# Patient Record
Sex: MALE | Race: WHITE | Employment: OTHER | URBAN - METROPOLITAN AREA
[De-identification: names, ages, dates, MRNs, and addresses within clinical notes are randomized per-mention and may not be internally consistent; named-entity substitution may affect disease eponyms.]

---

## 2017-11-30 ENCOUNTER — APPOINTMENT (OUTPATIENT)
Dept: CT IMAGING | Facility: HOSPITAL | Age: 62
End: 2017-11-30
Attending: EMERGENCY MEDICINE
Payer: COMMERCIAL

## 2017-11-30 ENCOUNTER — HOSPITAL ENCOUNTER (OUTPATIENT)
Facility: HOSPITAL | Age: 62
Setting detail: OBSERVATION
Discharge: HOME | End: 2017-12-02
Attending: EMERGENCY MEDICINE | Admitting: HOSPITALIST
Payer: COMMERCIAL

## 2017-11-30 DIAGNOSIS — R41.82 ALTERED MENTAL STATUS, UNSPECIFIED ALTERED MENTAL STATUS TYPE: Primary | ICD-10-CM

## 2017-11-30 PROCEDURE — 99219 INITIAL OBSERVATION CARE,LEVL II: CPT | Performed by: HOSPITALIST

## 2017-11-30 PROCEDURE — 70450 CT HEAD/BRAIN W/O DYE: CPT | Performed by: EMERGENCY MEDICINE

## 2017-12-01 PROCEDURE — 90792 PSYCH DIAG EVAL W/MED SRVCS: CPT | Performed by: OTHER

## 2017-12-01 PROCEDURE — 99226 SUBSEQUENT OBSERVATION CARE: CPT | Performed by: HOSPITALIST

## 2017-12-01 RX ORDER — ACETAMINOPHEN 325 MG/1
650 TABLET ORAL EVERY 6 HOURS PRN
Status: DISCONTINUED | OUTPATIENT
Start: 2017-12-01 | End: 2017-12-02

## 2017-12-01 RX ORDER — PANTOPRAZOLE SODIUM 40 MG/1
40 TABLET, DELAYED RELEASE ORAL
Status: DISCONTINUED | OUTPATIENT
Start: 2017-12-01 | End: 2017-12-02

## 2017-12-01 RX ORDER — ENOXAPARIN SODIUM 100 MG/ML
40 INJECTION SUBCUTANEOUS DAILY
Status: DISCONTINUED | OUTPATIENT
Start: 2017-12-01 | End: 2017-12-02

## 2017-12-01 RX ORDER — METOPROLOL SUCCINATE 50 MG/1
50 TABLET, EXTENDED RELEASE ORAL DAILY
Status: DISCONTINUED | OUTPATIENT
Start: 2017-12-01 | End: 2017-12-01

## 2017-12-01 RX ORDER — ONDANSETRON 2 MG/ML
4 INJECTION INTRAMUSCULAR; INTRAVENOUS EVERY 6 HOURS PRN
Status: DISCONTINUED | OUTPATIENT
Start: 2017-12-01 | End: 2017-12-02

## 2017-12-01 RX ORDER — FLUOXETINE HYDROCHLORIDE 20 MG/1
40 CAPSULE ORAL DAILY
Status: DISCONTINUED | OUTPATIENT
Start: 2017-12-01 | End: 2017-12-02

## 2017-12-01 RX ORDER — ZIPRASIDONE MESYLATE 20 MG/ML
10 INJECTION, POWDER, LYOPHILIZED, FOR SOLUTION INTRAMUSCULAR EVERY 4 HOURS PRN
Status: DISCONTINUED | OUTPATIENT
Start: 2017-12-01 | End: 2017-12-02

## 2017-12-01 RX ORDER — MEMANTINE HYDROCHLORIDE 10 MG/1
10 TABLET ORAL 2 TIMES DAILY
Status: DISCONTINUED | OUTPATIENT
Start: 2017-12-01 | End: 2017-12-02

## 2017-12-01 RX ORDER — DIVALPROEX SODIUM 250 MG/1
250 TABLET, DELAYED RELEASE ORAL 2 TIMES DAILY
Status: DISCONTINUED | OUTPATIENT
Start: 2017-12-01 | End: 2017-12-02

## 2017-12-01 RX ORDER — DONEPEZIL HYDROCHLORIDE 10 MG/1
10 TABLET, FILM COATED ORAL NIGHTLY
Status: DISCONTINUED | OUTPATIENT
Start: 2017-12-01 | End: 2017-12-02

## 2017-12-01 RX ORDER — CLONAZEPAM 1 MG/1
1 TABLET ORAL 2 TIMES DAILY PRN
Status: DISCONTINUED | OUTPATIENT
Start: 2017-12-01 | End: 2017-12-02

## 2017-12-01 RX ORDER — ATORVASTATIN CALCIUM 20 MG/1
20 TABLET, FILM COATED ORAL DAILY
Status: DISCONTINUED | OUTPATIENT
Start: 2017-12-01 | End: 2017-12-02

## 2017-12-01 NOTE — H&P
[facility] Patient Status:  Emergency    1955 MRN [id number]   Location [address] Attending Clinton Gardiner MD   Hosp Day # 0 PCP Jenifer Gomes MD, MD     Date HCl 10 MG Oral Tab   Yes No   Sig: Take 10 mg by mouth 2 (two) times daily. Metoprolol Succinate ER (TOPROL XL) 50 MG Oral Tablet 24 Hr   Yes No   Sig: Take 1 tablet by mouth daily.    Multiple Vitamins-Minerals (MULTIVITAMIN OR)   Yes No   Sig: Take 1 ta versus brief reactive psychosis  Psychiatry has been consulted, patient will require one-to-one sitter. We will continue to monitor, use Geodon as needed for agitation.   Resume Aricept and Namenda    Benign hypertension  Blood pressure well controlled we

## 2017-12-01 NOTE — PLAN OF CARE
Problem: Patient/Family Goals  Goal: Patient/Family Long Term Goal  Patient's Long Term Goal: return home    Interventions:  - follow POC    - See additional Care Plan goals for specific interventions    Outcome: Progressing    Goal: Patient/Family Short T seizure to MD/LIP  - If seizure occurs, turn patient to side and suction secretions as needed  - Reorient patient post seizure  - Seizure pads on all 4 side rails  - Instruct patient/family to notify RN of any seizure activity  - Instruct patient/family to

## 2017-12-01 NOTE — CM/SW NOTE
SW contacted pt's wife/Marcela to discuss discharge planning. Pt lives at home w/ wife. Pt does not use any DME or services at this time. Per wife, she cooks for pt, but pt is independent w/ all other ADL's. Pt does not drive.      SEKOU discussed situation that

## 2017-12-01 NOTE — ED PROVIDER NOTES
Patient Seen in: [facility] Emergency Department    History   Patient presents with:  Eval-P (psychiatric)    Stated Complaint: Psych    HPI    46-year-old male with history of hypertension, hyperlipidemia, anxiety, depression, brought in by famil vomiting. Genitourinary: Negative for dysuria, flank pain and frequency. Musculoskeletal: Negative for back pain. Skin: Negative for rash. Neurological: Negative for weakness, light-headedness and headaches.    Psychiatric/Behavioral: Positive for a Nursing note and vitals reviewed.           ED Course     PROCEDURES:  none    DIAGNOSTICS:   Labs:    Recent Results (from the past 24 hour(s))  -BASIC METABOLIC PANEL (8)   Collection Time: 11/30/17  7:36 PM   Result Value Ref Range   Glucose 97 70 - 99 4.0 K/UL   Monocyte Absolute 0.8 0.0 - 1.0 K/UL   Eosinophil Absolute 0.3 0.0 - 0.7 K/UL   Basophil Absolute 0.0 0.0 - 0.2 K/UL       Imaging Results Available and Reviewed by me while in ED:  Ct Brain Or Head ([id number])    Result Date: 11/30/2017  CONCLUSION DEPARTMENT MEDICAL DECISION MAKING:  After obtaining the patient's history, performing the physical exam and reviewing the diagnostics, multiple initial diagnoses were considered based on the presenting problem including dementia, aggressive behavior, psyc
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
## 2017-12-02 VITALS
OXYGEN SATURATION: 94 % | HEIGHT: 68 IN | RESPIRATION RATE: 16 BRPM | BODY MASS INDEX: 26.52 KG/M2 | TEMPERATURE: 99 F | DIASTOLIC BLOOD PRESSURE: 79 MMHG | WEIGHT: 175 LBS | SYSTOLIC BLOOD PRESSURE: 148 MMHG | HEART RATE: 80 BPM

## 2017-12-02 PROCEDURE — 99217 OBSERVATION CARE DISCHARGE: CPT | Performed by: HOSPITALIST

## 2017-12-02 RX ORDER — CLONAZEPAM 0.5 MG/1
0.5 TABLET ORAL 3 TIMES DAILY PRN
Qty: 30 TABLET | Refills: 0 | Status: ON HOLD | OUTPATIENT
Start: 2017-12-02 | End: 2018-06-02

## 2017-12-02 RX ORDER — DIVALPROEX SODIUM 250 MG/1
250 TABLET, DELAYED RELEASE ORAL 2 TIMES DAILY
Qty: 60 TABLET | Refills: 0 | Status: ON HOLD | OUTPATIENT
Start: 2017-12-02 | End: 2018-06-02

## 2017-12-02 NOTE — PLAN OF CARE
NEUROLOGICAL - ADULT    • Achieves stable or improved neurological status Not Progressing    • Absence of seizures Not Progressing    • Remains free of injury related to seizure activity Not Progressing    • Achieves maximal functionality and self care Not

## 2017-12-02 NOTE — PLAN OF CARE
Problem: Patient/Family Goals  Goal: Patient/Family Long Term Goal  Patient's Long Term Goal: return home    Interventions:  - follow POC    - See additional Care Plan goals for specific interventions    Outcome: Progressing  Confused, impulsive with tende neurological status   Outcome: Progressing    Goal: Remains free of injury related to seizure activity  INTERVENTIONS:  - Maintain airway, patient safety  and administer oxygen as ordered  - Monitor patient for seizure activity, document and report duratio

## 2017-12-02 NOTE — PROGRESS NOTES
[facility]    Progress Note    Charbel Chahal.  [facility] Patient Status:  Observation    1955 MRN [id number]   [facility] 5SW/SE Attending Stella Rosales MD   Hosp Day # 0 PCP Chester Loza MD, MD       Subjective: consider SNF, SW to follow  D/w Dr. Alexi Lynn  Results:     Lab Results  Component Value Date   WBC 10.4 11/30/2017   HGB 15.4 11/30/2017   HCT 45.1 11/30/2017    11/30/2017   CREATSERUM 0.74 11/30/2017   BUN 13 11/30/2017    11/30/2017   K 3.9

## 2017-12-02 NOTE — PLAN OF CARE
Pt discharged home with all of his belongings accompanied by family. Discharge instructions and prescriptions given to pt's wife. Pt's wife verbalized understanding.  Iv discontinued

## 2017-12-04 NOTE — CONSULTS
[facility] HOSP - [facility]    Report of Consultation    Shital Bergeron Patient Status:  Observation    1955 MRN [id number]   [facility] 5SW/SE Attending No att. providers found   Hosp Day # 0 PCP Ashley Bellamy MD, MD     Date Medical History  Past Medical History:   Diagnosis Date   • Anxiety state, unspecified    • Dementia    • Depression    • Other and unspecified hyperlipidemia    • Unspecified essential hypertension        Past Surgical History  Past Surgical History:  No exhibited anxious and stressful affect especially when he make an effort to remember. Patient exhibited significant cognitive impairment and impairment in his immediate and recent memory.   Patient exhibited blocking thought the process and distractibility

## 2017-12-22 NOTE — DISCHARGE SUMMARY
[facility] HOSPITALIST  DISCHARGE SUMMARY     Medardo BullaMaritza Bergeron Patient Status:  Observation    1955 MRN [id number]   [facility] 5SW/SE Attending No att. providers found   Hosp Day # 0 PCP Yaron Lopez MD, MD     Date of Admission: taking these medications      Instructions Prescription details   divalproex Sodium 250 MG Tbec DR tab  Commonly known as:  DEPAKOTE      Take 1 tablet (250 mg total) by mouth 2 (two) times daily.    Quantity:  60 tablet  Refills:  0        CHANGE how you t Ankita    Schedule an appointment as soon as possible for a visit in 1 week      MD [facility] [address]  [address] [facility] [phone]    Schedule an appointment as soon as possible for a visit in 3

## 2018-05-28 ENCOUNTER — HOSPITAL ENCOUNTER (INPATIENT)
Facility: HOSPITAL | Age: 63
LOS: 5 days | Discharge: HOME | DRG: 057 | End: 2018-06-02
Attending: EMERGENCY MEDICINE | Admitting: HOSPITALIST

## 2018-05-28 ENCOUNTER — APPOINTMENT (OUTPATIENT)
Dept: CT IMAGING | Facility: HOSPITAL | Age: 63
DRG: 057 | End: 2018-05-28
Attending: EMERGENCY MEDICINE

## 2018-05-28 ENCOUNTER — APPOINTMENT (OUTPATIENT)
Dept: GENERAL RADIOLOGY | Facility: HOSPITAL | Age: 63
DRG: 057 | End: 2018-05-28
Attending: EMERGENCY MEDICINE

## 2018-05-28 DIAGNOSIS — G31.83 LEWY BODY DEMENTIA WITH BEHAVIORAL DISTURBANCE (HCC): ICD-10-CM

## 2018-05-28 DIAGNOSIS — F02.81 LEWY BODY DEMENTIA WITH BEHAVIORAL DISTURBANCE (HCC): ICD-10-CM

## 2018-05-28 DIAGNOSIS — E78.5 HYPERLIPIDEMIA, UNSPECIFIED HYPERLIPIDEMIA TYPE: ICD-10-CM

## 2018-05-28 DIAGNOSIS — F09 COGNITIVE DISORDER: ICD-10-CM

## 2018-05-28 DIAGNOSIS — R41.82 ALTERED MENTAL STATUS, UNSPECIFIED ALTERED MENTAL STATUS TYPE: Primary | ICD-10-CM

## 2018-05-28 PROBLEM — F03.90 RAPIDLY PROGRESSIVE DEMENTIA (HCC): Status: ACTIVE | Noted: 2018-05-28

## 2018-05-28 PROCEDURE — 70450 CT HEAD/BRAIN W/O DYE: CPT | Performed by: EMERGENCY MEDICINE

## 2018-05-28 PROCEDURE — 99220 INITIAL OBSERVATION CARE,LEVL III: CPT | Performed by: HOSPITALIST

## 2018-05-28 PROCEDURE — 71045 X-RAY EXAM CHEST 1 VIEW: CPT | Performed by: EMERGENCY MEDICINE

## 2018-05-28 RX ORDER — SODIUM CHLORIDE 0.9 % (FLUSH) 0.9 %
3 SYRINGE (ML) INJECTION AS NEEDED
Status: DISCONTINUED | OUTPATIENT
Start: 2018-05-28 | End: 2018-06-02

## 2018-05-28 RX ORDER — CLONAZEPAM 1 MG/1
1 TABLET ORAL 3 TIMES DAILY PRN
Status: DISCONTINUED | OUTPATIENT
Start: 2018-05-28 | End: 2018-06-02

## 2018-05-28 RX ORDER — MEMANTINE HYDROCHLORIDE 10 MG/1
10 TABLET ORAL 2 TIMES DAILY
Status: DISCONTINUED | OUTPATIENT
Start: 2018-05-28 | End: 2018-06-02

## 2018-05-28 RX ORDER — LORAZEPAM 1 MG/1
1 TABLET ORAL EVERY 6 HOURS PRN
Status: DISCONTINUED | OUTPATIENT
Start: 2018-05-28 | End: 2018-05-29

## 2018-05-28 RX ORDER — ACETAMINOPHEN 325 MG/1
650 TABLET ORAL EVERY 6 HOURS PRN
Status: DISCONTINUED | OUTPATIENT
Start: 2018-05-28 | End: 2018-06-02

## 2018-05-28 RX ORDER — ONDANSETRON 2 MG/ML
4 INJECTION INTRAMUSCULAR; INTRAVENOUS EVERY 6 HOURS PRN
Status: DISCONTINUED | OUTPATIENT
Start: 2018-05-28 | End: 2018-06-02

## 2018-05-28 RX ORDER — FLUOXETINE HYDROCHLORIDE 20 MG/1
40 CAPSULE ORAL DAILY
Status: DISCONTINUED | OUTPATIENT
Start: 2018-05-29 | End: 2018-06-02

## 2018-05-28 RX ORDER — DIVALPROEX SODIUM 250 MG/1
250 TABLET, DELAYED RELEASE ORAL 2 TIMES DAILY
Status: DISCONTINUED | OUTPATIENT
Start: 2018-05-28 | End: 2018-05-29

## 2018-05-28 RX ORDER — DOXEPIN HYDROCHLORIDE 50 MG/1
1 CAPSULE ORAL DAILY
Status: DISCONTINUED | OUTPATIENT
Start: 2018-05-29 | End: 2018-06-02

## 2018-05-28 RX ORDER — DONEPEZIL HYDROCHLORIDE 10 MG/1
10 TABLET, FILM COATED ORAL NIGHTLY
Status: DISCONTINUED | OUTPATIENT
Start: 2018-05-28 | End: 2018-06-02

## 2018-05-28 RX ORDER — ATORVASTATIN CALCIUM 20 MG/1
20 TABLET, FILM COATED ORAL DAILY
Status: DISCONTINUED | OUTPATIENT
Start: 2018-05-29 | End: 2018-06-02

## 2018-05-28 RX ORDER — PHENOL 1.4 %
1 AEROSOL, SPRAY (ML) MUCOUS MEMBRANE NIGHTLY
Status: ON HOLD | COMMUNITY
End: 2018-07-31

## 2018-05-28 NOTE — ED NOTES
Wife at bedside. Pt resting on cart. Remains calm and cooperative. Remains confused which is his normal. Denies pain or discomfort.

## 2018-05-28 NOTE — ED PROVIDER NOTES
Patient Seen in: [facility] AND [facility] Emergency Department    History   Patient presents with:  Altered Mental Status (neurologic)    Stated Complaint:     HPI    68-year-old male with history of frontal lobe dementia as well as hypertension living with wif Normal rate, regular rhythm and intact and equal distal pulses. Pulmonary/Chest: Effort normal. No respiratory distress. Clear breath sounds b/l. Abdominal: Soft. There is no tenderness. There is no guarding. Musculoskeletal: Normal range of motion. tests on the individual orders.    ETHYL ALCOHOL   RAINBOW DRAW BLUE   RAINBOW DRAW LAVENDER   RAINBOW DRAW DARK GREEN   RAINBOW DRAW LIGHT GREEN   RAINBOW DRAW GOLD   RAINBOW DRAW LAVENDER TALL (BNP)     EKG    Rate, intervals and axes as noted on EKG Repo small vessel ischemic changes are noted. CEREBELLUM: No evidence of edema, hemorrhage, mass effect or acute infarction. BRAINSTEM: No evidence of edema, hemorrhage, mass effect or acute infarction. CALVARIUM: No mass or other significant visible lesion.   [place]

## 2018-05-28 NOTE — ED INITIAL ASSESSMENT (HPI)
Pt here per Osmin Burton with c/o AMS. Pt has dementia. Pt was outside mowing lawn at neighbors and pt was not acting right so spouse call 911. Pt has no complaints, denies pain or discomfort. Pt is verbal and alert and oriented to self.

## 2018-05-29 PROCEDURE — 99233 SBSQ HOSP IP/OBS HIGH 50: CPT | Performed by: HOSPITALIST

## 2018-05-29 PROCEDURE — 99223 1ST HOSP IP/OBS HIGH 75: CPT | Performed by: OTHER

## 2018-05-29 RX ORDER — LORAZEPAM 2 MG/ML
0.5 INJECTION INTRAMUSCULAR EVERY 4 HOURS PRN
Status: DISCONTINUED | OUTPATIENT
Start: 2018-05-29 | End: 2018-06-02

## 2018-05-29 RX ORDER — POTASSIUM CHLORIDE 20 MEQ/1
40 TABLET, EXTENDED RELEASE ORAL ONCE
Status: COMPLETED | OUTPATIENT
Start: 2018-05-29 | End: 2018-05-29

## 2018-05-29 RX ORDER — QUETIAPINE 25 MG/1
25 TABLET, FILM COATED ORAL NIGHTLY
Status: DISCONTINUED | OUTPATIENT
Start: 2018-05-29 | End: 2018-05-30

## 2018-05-29 RX ORDER — LEVOTHYROXINE SODIUM 0.03 MG/1
25 TABLET ORAL
Status: DISCONTINUED | OUTPATIENT
Start: 2018-05-29 | End: 2018-06-02

## 2018-05-29 RX ORDER — IBUPROFEN 200 MG
CAPSULE ORAL 2 TIMES DAILY
Status: DISCONTINUED | OUTPATIENT
Start: 2018-05-29 | End: 2018-06-02

## 2018-05-29 RX ORDER — HALOPERIDOL 2 MG/ML
1 SOLUTION ORAL ONCE
Status: DISCONTINUED | OUTPATIENT
Start: 2018-05-29 | End: 2018-05-29

## 2018-05-29 RX ORDER — LORAZEPAM 2 MG/ML
0.5 INJECTION INTRAMUSCULAR ONCE
Status: COMPLETED | OUTPATIENT
Start: 2018-05-29 | End: 2018-05-29

## 2018-05-29 RX ORDER — LORAZEPAM 2 MG/ML
INJECTION INTRAMUSCULAR
Status: COMPLETED
Start: 2018-05-29 | End: 2018-05-29

## 2018-05-29 RX ORDER — DIVALPROEX SODIUM 500 MG/1
500 TABLET, DELAYED RELEASE ORAL 2 TIMES DAILY
Status: DISCONTINUED | OUTPATIENT
Start: 2018-05-29 | End: 2018-06-02

## 2018-05-29 NOTE — PROGRESS NOTES
[facility]    Progress Note    [facility] Patient Status:  Inpatient    1955 MRN [id number]   [facility] 5SW/SE Attending Roosvelt Habermann, MD   Hosp Day # 1 PCP Saúl Rosales MD, MD       Subjective:   Chao Lance Nightly   • FLUoxetine HCl  40 mg Oral Daily   • melatonin  10 mg Oral Nightly   • Memantine HCl  10 mg Oral BID   • multivitamin  1 tablet Oral Daily       Current PRN Inpatient Meds:      Normal Saline Flush, acetaminophen, ondansetron HCl, LORazepam, Cl 4. Atherosclerosis. 5. Pansinusitis. 6.    No significant change from November 30, 2017. This report was called immediately at 17:17 hours to Emergency Department and discussed with Dr. Venice Bermeo. Dictated by (CST):  Camryn Bull MD on 5/28/

## 2018-05-29 NOTE — PLAN OF CARE
Problem: Patient Centered Care  Goal: Patient preferences are identified and integrated in the patient's plan of care  Interventions:  - What would you like us to know as we care for you?  Patient wife wants to be kept updated  - Provide timely, complete, a skin care algorithm/standards of care as needed   Outcome: Progressing  Skin will remain clean, dry, and intact. Patient is encouraged to turn and reposition self while in bed every 2 hours. Prompt care is given to incontinence.  Skin is assess for redness indicated by assessment.  - Educate pt/family on patient safety including physical limitations  - Instruct pt to call for assistance with activity based on assessment  - Modify environment to reduce risk of injury  - Provide assistive devices as appropriat

## 2018-05-29 NOTE — H&P
[facility]    PATIENT'S NAME: Rafa Kennedy   ATTENDING PHYSICIAN: Segundo Fermin MD   PATIENT ACCOUNT#:   [id number]    LOCATION:  [place] [address]  MEDICAL RECORD #:   [id number]       YOB: 1955  ADMISSION DATE:       05/28 blood pressure 137/84, pulse ox 94% on room air. HEENT:  Atraumatic. Oropharynx clear. Moist mucous membranes. Normal hard and soft palate. Eyes:  Anicteric sclerae. Pupils equal, round, and reactive to light and accommodation.     NECK:  Trachea midl

## 2018-05-29 NOTE — CONSULTS
[facility] HOSP - [facility]    Report of Consultation    Janette Graham.  [facility] Patient Status:  Inpatient    1955 MRN [id number]   Location [facility] 5SW/SE Attending Maribel Cortés MD   Hosp Day # 1 PCP Akanksha Shea MD, MD     Date of Ad with the behavioral changes in that leg apparently was somewhat helpful. He was also on Aricept and Namenda. He was also on melatonin and Klonopin.   Otherwise patient did not develop any significant other symptoms, while there was some action tremors not mg 1 mg Oral TID PRN   Donepezil HCl (ARICEPT) tab 10 mg 10 mg Oral Nightly   FLUoxetine HCl (PROZAC) cap 40 mg 40 mg Oral Daily   melatonin cap/tab 10 mg 10 mg Oral Nightly   Memantine HCl (NAMENDA) tab 10 mg 10 mg Oral BID   multivitamin (ADULT) tab 1 ta memory and fund of knowledge. .  No clear abnormalities with language, though it was limited exam since patient barely talk to me and refused the rest of the exam    Cranial Nerves:    VII. Face symmetric, no facial weakness  VIII. Hearing decreased.     Rajeev differential includes Lewy body disease or frontotemporal dementia, at this point I am leaning towards the frontotemporal dementia as a more likely diagnosis especially with his disinhibition, personality changes, age of onset, and not clear if the symptom

## 2018-05-29 NOTE — CM/SW NOTE
SW contacted wife/Marcela to discuss discharge planning.  SW is familiar w/ pt due to previous encounter back on December 1st, 2017 - pt was hallucinating and was admitted to the hospital.    Per wife, pt lives at home w/ her, pt's son, his fiance, and their able to afford to privately pay at a facility and pay for private pay caregivers at this time. SW requested copy of card, if it is provided while pt is here at the hospital. Wife stated that if insurance will pay, she would like to consider rehab options.

## 2018-05-29 NOTE — PLAN OF CARE
Problem: Patient Centered Care  Goal: Patient preferences are identified and integrated in the patient's plan of care  Interventions:  - What would you like us to know as we care for you?  Patient wife wants to be kept updated  - Provide timely, complete, a Implement oral medicated treatments as ordered  Outcome: Progressing      Problem: NEUROLOGICAL - ADULT  Goal: Achieves stable or improved neurological status  INTERVENTIONS  - Assess for and report changes in neurological status  - Initiate measures to pr

## 2018-05-29 NOTE — PLAN OF CARE
Dr. Nabil Martel was notified regarding patient having an abrasion noted to left wrist. Neosporin ointment was ordered bid per doctor orders.

## 2018-05-30 PROCEDURE — 90792 PSYCH DIAG EVAL W/MED SRVCS: CPT | Performed by: OTHER

## 2018-05-30 PROCEDURE — 99231 SBSQ HOSP IP/OBS SF/LOW 25: CPT | Performed by: OTHER

## 2018-05-30 PROCEDURE — 99233 SBSQ HOSP IP/OBS HIGH 50: CPT | Performed by: HOSPITALIST

## 2018-05-30 RX ORDER — QUETIAPINE 25 MG/1
50 TABLET, FILM COATED ORAL NIGHTLY
Status: DISCONTINUED | OUTPATIENT
Start: 2018-05-30 | End: 2018-06-02

## 2018-05-30 RX ORDER — POTASSIUM CHLORIDE 20 MEQ/1
40 TABLET, EXTENDED RELEASE ORAL EVERY 4 HOURS
Status: COMPLETED | OUTPATIENT
Start: 2018-05-30 | End: 2018-05-30
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
## 2018-05-30 NOTE — PROGRESS NOTES
PSYCH CONSULT    Date of Admission: 5/28/18  Date of Consult: 5/30/18  Reason for Consultation: Altered mental status, agitation    Impression:  AXIS 1: Severe major neurocognitive disorder/dementia with behavioral disturbance.  Suspected Lewy body vs front

## 2018-05-30 NOTE — PLAN OF CARE
Problem: Patient Centered Care  Goal: Patient preferences are identified and integrated in the patient's plan of care  Interventions:  - What would you like us to know as we care for you?  Patient wife wants to be kept updated  - Provide timely, complete, a pressure  - Maintain blood pressure and fluid volume within ordered parameters to optimize cerebral perfusion and minimize risk of hemorrhage  - Monitor temperature, glucose, and sodium.  Initiate appropriate interventions as ordered   Outcome: Progressing

## 2018-05-30 NOTE — PROGRESS NOTES
[facility] AND [facility]  Neurology Progress Note    Madrid Suhas  [facility] Patient Status:  Inpatient    1955 MRN [id number]   Location [facility] 5SW/SE Attending Chitra Alicea Day # 2 PCP Dayanna Nunes MD, MD     Subjective:  Alivia Dutta Oral Oral   SpO2: 94% 95% 95% 94%   Weight:       Height:           General: No apparent distress, well nourished, well groomed.   Head- Normocephalic, atraumatic  Eyes- No redness or swelling  Neck- No masses or adenopathy  CV: pulses were palpable and nor 5/28/2018  CONCLUSION:  1. No acute intracranial changes. 2. Central and cortical atrophy. 3. Chronic small vessel ischemic changes. 4. Atherosclerosis. 5. Pansinusitis. 6.    No significant change from November 30, 2017.   This report was called immediatel

## 2018-05-30 NOTE — CONSULTS
[facility]  Report of Psychiatric Consultation    Liberty Vogel.  [facility] Patient Status:  Inpatient    1955 MRN [id number]   Location [facility] 5SW/SE Attending Jacquelyn Rocha,*   Hosp Day # 2 PCP Pia Kelly MD, MD     Date of or the date/time. He is unable to tell me what he did for work in the past.     Past Psychiatric History: Severe major neurocognitive disorder.      Substance Use History: Ex-cigarette smoker    Social and Developmental History: Lives with his wife, but Willian Fabian tablet, Oral, Daily    Review of Systems   Constitutional: Positive for malaise/fatigue. Psychiatric/Behavioral: Positive for memory loss. Negative for depression and suicidal ideas. The patient is nervous/anxious.       Psychiatry Review Systems: No voic

## 2018-05-30 NOTE — PROGRESS NOTES
[facility]    Progress Note    Sumit Zheng.  [facility] Patient Status:  Inpatient    1955 MRN [id number]   Location [facility] 5SW/SE Attending Chitra Alicea Day # 2 PCP Danette Small MD, MD     Subjective: treatment with synthroid       Hyperlipidemia, unspecified hyperlipidemia type  -statin      Rapidly progressive lewy body dementia  -as above     VTE : SCDs    38 min spent on pt of which 20 min spent counseling wife by phone with his permission and tryin occurred Electronically signed on 05/29/2018 at 15:47 by Joey Denis MD  5/30/2018

## 2018-05-31 PROCEDURE — 99233 SBSQ HOSP IP/OBS HIGH 50: CPT | Performed by: HOSPITALIST

## 2018-05-31 PROCEDURE — 99232 SBSQ HOSP IP/OBS MODERATE 35: CPT | Performed by: OTHER

## 2018-05-31 PROCEDURE — 99231 SBSQ HOSP IP/OBS SF/LOW 25: CPT | Performed by: OTHER

## 2018-05-31 RX ORDER — LORAZEPAM 2 MG/ML
2 INJECTION INTRAMUSCULAR ONCE
Status: COMPLETED | OUTPATIENT
Start: 2018-05-31 | End: 2018-06-01

## 2018-05-31 RX ORDER — POTASSIUM CHLORIDE 20 MEQ/1
40 TABLET, EXTENDED RELEASE ORAL ONCE
Status: COMPLETED | OUTPATIENT
Start: 2018-05-31 | End: 2018-05-31

## 2018-05-31 NOTE — PLAN OF CARE
Problem: Patient Centered Care  Goal: Patient preferences are identified and integrated in the patient's plan of care  Interventions:  - What would you like us to know as we care for you?  Patient wife wants to be kept updated  - Provide timely, complete, a Progressing    Goal: Achieves maximal functionality and self care  INTERVENTIONS  - Monitor swallowing and airway patency with patient fatigue and changes in neurological status  - Encourage and assist patient to increase activity and self care with carole

## 2018-05-31 NOTE — PROGRESS NOTES
[facility]  Report of Psychiatric Progress Note    Date of Admission: 5/28/18  Date of Service: 5/31/18  Reason for Consultation: Altered mental status, agitation     Impression:  AXIS 1: Severe major neurocognitive disorder/dementia with behavioral d thinks that his son who in his room is someone who works for him. Today he remembers that he is . He is only oriented to self. Feels \"ok\", denies anxiety or depressed mood.  He talks about how he is getting jobs and how \"you can't just leave a chuck 05/31/18  1447   BP: (!) 133/92   Pulse: 75   Resp: 16   Temp: 98 °F (36.7 °C)     Appearance: fair grooming  Behavior: normal psychomotor at this moment  Attitude: cooperative     Speech: normal rate, rhythm and volume     Mood: \"Good\"  Affect: Calm

## 2018-05-31 NOTE — PLAN OF CARE
Patient sitting up in recliner chair. Patient's wife is sitting at side. Patient is calm and cooperative at present time. Sitter present at bedside. Call light within reach. Patient will be monitored for safety and comfort.

## 2018-05-31 NOTE — PROGRESS NOTES
[facility] AND [facility]  Neurology Progress Note    Vanesa Carnes  [facility] Patient Status:  Inpatient    1955 MRN [id number]   Location [facility] 5SW/SE Attending Chitra Alicea Day # 3 PCP Susana Crain MD, MD     Subjective:  Martha Range General: No apparent distress, well nourished, well groomed.   Head- Normocephalic, atraumatic  Eyes- No redness or swelling  Neck- No masses or adenopathy  CV: pulses were palpable and normal, no cyanosis or edema     Neurological:     Mental Status- Frontotemporal dementia      Patient was possibly frontotemporal dementia, severe executive dysfunction, personality changes. Will continue with increased dose of Seroquel and Depakote and same dose of Namenda and Aricept.   It is not clear how safe for th

## 2018-05-31 NOTE — PLAN OF CARE
Patient is presently resting in the bed. Patient is monitored for safety and comfort. Sitter present at bedside. Call light within reach.

## 2018-05-31 NOTE — PROGRESS NOTES
[facility]    Progress Note    Sumit Zheng.  [facility] Patient Status:  Inpatient    1955 MRN [id number]   Location [facility] 5SW/SE Attending Chitra Alicea Day # 3 PCP Danette Small MD, MD     Subjective: GLU 89 05/31/2018   CA 9.1 05/31/2018   ALB 4.5 05/28/2018   ALKPHO 68 05/28/2018   BILT 1.0 05/28/2018   TP 7.3 05/28/2018   AST 27 05/28/2018   ALT 31 05/28/2018   T4F 0.63 05/29/2018   TSH 9.12 (H) 05/29/2018   ESRML 6 12/01/2017   MG 1.9 05/31/2018

## 2018-06-01 PROCEDURE — 99232 SBSQ HOSP IP/OBS MODERATE 35: CPT | Performed by: HOSPITALIST

## 2018-06-01 RX ORDER — POTASSIUM CHLORIDE 20 MEQ/1
40 TABLET, EXTENDED RELEASE ORAL ONCE
Status: COMPLETED | OUTPATIENT
Start: 2018-06-01 | End: 2018-06-01

## 2018-06-01 RX ORDER — MAGNESIUM HYDROXIDE/ALUMINUM HYDROXICE/SIMETHICONE 120; 1200; 1200 MG/30ML; MG/30ML; MG/30ML
30 SUSPENSION ORAL 4 TIMES DAILY PRN
Status: DISCONTINUED | OUTPATIENT
Start: 2018-06-01 | End: 2018-06-02

## 2018-06-01 NOTE — PLAN OF CARE
Problem: Patient Centered Care  Goal: Patient preferences are identified and integrated in the patient's plan of care  Interventions:  - What would you like us to know as we care for you?  Patient wife wants to be kept updated  - Provide timely, complete, a and fluid volume within ordered parameters to optimize cerebral perfusion and minimize risk of hemorrhage  - Monitor temperature, glucose, and sodium.  Initiate appropriate interventions as ordered   Outcome: Progressing    Goal: Achieves maximal functional was off by two days, but got the month and year correct. VSS.

## 2018-06-01 NOTE — PLAN OF CARE
Problem: Patient Centered Care  Goal: Patient preferences are identified and integrated in the patient's plan of care  Interventions:  - What would you like us to know as we care for you?  Patient wife wants to be kept updated  - Provide timely, complete, a Achieves stable or improved neurological status  INTERVENTIONS  - Assess for and report changes in neurological status  - Initiate measures to prevent increased intracranial pressure  - Maintain blood pressure and fluid volume within ordered parameters to circulation, skin condition, hydration, nutrition and elimination needs   - Reorient and redirection as needed  - Assess for the need to continue restraints   Outcome: Progressing  Sitter at bedside

## 2018-06-01 NOTE — PROGRESS NOTES
[facility]    Progress Note    Lorenza Singh.  [facility] Patient Status:  Inpatient    1955 MRN [id number]   Location [facility] 5SW/SE Attending Chitra Alicea # 4 PCP Nirav Rojas MD, MD     Subjective:    Hypothyroidism  -possibly contributing to above  -will being treatment with synthroid      Hyperlipidemia, unspecified hyperlipidemia type  -statin      Rapidly progressive lewy body dementia vs frontotemporal  -as above    Obesity with bmi 30.52 may

## 2018-06-01 NOTE — CM/SW NOTE
SW contacted pt's wife/Marcela to discuss discharge planning. Wife stated that they have decided to take pt home from the hospital, as it is a familiar environment for pt.  SW discussed resource - [facility] - for future reference in assistance w/ placem

## 2018-06-02 VITALS
HEART RATE: 69 BPM | OXYGEN SATURATION: 91 % | RESPIRATION RATE: 18 BRPM | BODY MASS INDEX: 30.4 KG/M2 | HEIGHT: 66 IN | DIASTOLIC BLOOD PRESSURE: 73 MMHG | TEMPERATURE: 98 F | WEIGHT: 189.13 LBS | SYSTOLIC BLOOD PRESSURE: 123 MMHG

## 2018-06-02 PROCEDURE — 99239 HOSP IP/OBS DSCHRG MGMT >30: CPT | Performed by: HOSPITALIST

## 2018-06-02 RX ORDER — LEVOTHYROXINE SODIUM 0.03 MG/1
25 TABLET ORAL
Qty: 30 TABLET | Refills: 0 | Status: ON HOLD | OUTPATIENT
Start: 2018-06-03 | End: 2018-07-31

## 2018-06-02 RX ORDER — CLONAZEPAM 1 MG/1
1 TABLET ORAL 3 TIMES DAILY PRN
Qty: 60 TABLET | Refills: 0 | Status: ON HOLD | OUTPATIENT
Start: 2018-06-02 | End: 2018-07-31

## 2018-06-02 RX ORDER — QUETIAPINE 50 MG/1
50 TABLET, FILM COATED ORAL NIGHTLY
Qty: 30 TABLET | Refills: 0 | Status: ON HOLD | OUTPATIENT
Start: 2018-06-02 | End: 2018-06-15

## 2018-06-02 RX ORDER — DIVALPROEX SODIUM 500 MG/1
500 TABLET, DELAYED RELEASE ORAL 2 TIMES DAILY
Qty: 60 TABLET | Refills: 0 | Status: ON HOLD | OUTPATIENT
Start: 2018-06-02 | End: 2018-07-31

## 2018-06-02 RX ORDER — POTASSIUM CHLORIDE 20 MEQ/1
40 TABLET, EXTENDED RELEASE ORAL ONCE
Status: COMPLETED | OUTPATIENT
Start: 2018-06-02 | End: 2018-06-02

## 2018-06-02 NOTE — PLAN OF CARE
Problem: Patient Centered Care  Goal: Patient preferences are identified and integrated in the patient's plan of care  Interventions:  - What would you like us to know as we care for you?  Patient wife wants to be kept updated  - Provide timely, complete, a activity based on assessment  - Modify environment to reduce risk of injury  - Provide assistive devices as appropriate  - Consider OT/PT consult to assist with strengthening/mobility  - Encourage toileting schedule   Outcome: Progressing

## 2018-06-02 NOTE — DISCHARGE SUMMARY
More than 30 min spent on dc  Dc summary # [id number]  Hospital Discharge Diagnoses: frontotemporal vs lewy body dementia    Lace+ Score: 71  59-90 High Risk  29-58 Medium Risk  0-28   Low Risk. TCM Follow-Up Recommendation:  LACE > 58:  High Risk of readm

## 2018-06-02 NOTE — PLAN OF CARE
Problem: Patient Centered Care  Goal: Patient preferences are identified and integrated in the patient's plan of care  Interventions:  - What would you like us to know as we care for you?  Patient wife wants to be kept updated  - Provide timely, complete, a Achieves stable or improved neurological status  INTERVENTIONS  - Assess for and report changes in neurological status  - Initiate measures to prevent increased intracranial pressure  - Maintain blood pressure and fluid volume within ordered parameters to medications)  - Discontinue any unnecessary medical devices as soon as possible  - Assess the patient's physical comfort, circulation, skin condition, hydration, nutrition and elimination needs   - Reorient and redirection as needed  - Assess for the need

## 2018-06-04 NOTE — DISCHARGE SUMMARY
[facility]    PATIENT'S NAME: [address] [place] [address] PHYSICIAN: González Alicea MD   PATIENT ACCOUNT#:   [id number]    LOCATION:  [address] [place] [address] #:   [id number]       YOB: 1955  ADMISSION DATE:       05/ resources to have a 24-hour caregiver outside the family except for maybe an occasional respite, nor did they have the resources to admit him to an institution.   They will attempt to take care of him at home as best they can, realizing the limitations, pot with him 24/7.  2.   Hypothyroidism. Patient is started on medications. 3.   Hyperlipidemia. 4.   Rapidly progressive Lewy body dementia versus frontotemporal dementia. 5.   Obesity, body mass index 30.52 with obstructive sleep apnea.    6.   Deep venou

## 2018-06-13 ENCOUNTER — HOSPITAL ENCOUNTER (OUTPATIENT)
Facility: HOSPITAL | Age: 63
Setting detail: OBSERVATION
Discharge: HOME | End: 2018-06-15
Attending: EMERGENCY MEDICINE | Admitting: HOSPITALIST
Payer: COMMERCIAL

## 2018-06-13 ENCOUNTER — APPOINTMENT (OUTPATIENT)
Dept: GENERAL RADIOLOGY | Facility: HOSPITAL | Age: 63
End: 2018-06-13
Attending: EMERGENCY MEDICINE
Payer: COMMERCIAL

## 2018-06-13 DIAGNOSIS — R55 SYNCOPE, NEAR: ICD-10-CM

## 2018-06-13 DIAGNOSIS — R07.9 ACUTE CHEST PAIN: Primary | ICD-10-CM
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
## 2018-06-13 PROBLEM — E87.6 HYPOKALEMIA: Status: ACTIVE | Noted: 2018-06-13

## 2018-06-13 PROCEDURE — 71045 X-RAY EXAM CHEST 1 VIEW: CPT | Performed by: EMERGENCY MEDICINE

## 2018-06-13 PROCEDURE — 99220 INITIAL OBSERVATION CARE,LEVL III: CPT | Performed by: HOSPITALIST

## 2018-06-13 RX ORDER — METOPROLOL TARTRATE 100 MG/1
100 TABLET ORAL ONCE AS NEEDED
Status: DISCONTINUED | OUTPATIENT
Start: 2018-06-13 | End: 2018-06-15

## 2018-06-13 RX ORDER — ONDANSETRON 2 MG/ML
4 INJECTION INTRAMUSCULAR; INTRAVENOUS EVERY 6 HOURS PRN
Status: DISCONTINUED | OUTPATIENT
Start: 2018-06-13 | End: 2018-06-15

## 2018-06-13 RX ORDER — DILTIAZEM HYDROCHLORIDE 5 MG/ML
10 INJECTION INTRAVENOUS SEE ADMIN INSTRUCTIONS
Status: ACTIVE | OUTPATIENT
Start: 2018-06-13 | End: 2018-06-14

## 2018-06-13 RX ORDER — ZIPRASIDONE MESYLATE 20 MG/ML
20 INJECTION, POWDER, LYOPHILIZED, FOR SOLUTION INTRAMUSCULAR EVERY 6 HOURS PRN
Status: DISCONTINUED | OUTPATIENT
Start: 2018-06-13 | End: 2018-06-15

## 2018-06-13 RX ORDER — METOPROLOL TARTRATE 50 MG/1
50 TABLET, FILM COATED ORAL ONCE
Status: COMPLETED | OUTPATIENT
Start: 2018-06-14 | End: 2018-06-14

## 2018-06-13 RX ORDER — NITROGLYCERIN 0.4 MG/1
0.4 TABLET SUBLINGUAL ONCE
Status: DISCONTINUED | OUTPATIENT
Start: 2018-06-13 | End: 2018-06-15

## 2018-06-13 RX ORDER — HEPARIN SODIUM 5000 [USP'U]/ML
5000 INJECTION, SOLUTION INTRAVENOUS; SUBCUTANEOUS EVERY 12 HOURS SCHEDULED
Status: DISCONTINUED | OUTPATIENT
Start: 2018-06-13 | End: 2018-06-15

## 2018-06-13 RX ORDER — METOPROLOL TARTRATE 100 MG/1
100 TABLET ORAL ONCE
Status: COMPLETED | OUTPATIENT
Start: 2018-06-13 | End: 2018-06-13

## 2018-06-13 RX ORDER — METOPROLOL TARTRATE 5 MG/5ML
5 INJECTION INTRAVENOUS SEE ADMIN INSTRUCTIONS
Status: ACTIVE | OUTPATIENT
Start: 2018-06-13 | End: 2018-06-14

## 2018-06-13 RX ORDER — METOPROLOL TARTRATE 5 MG/5ML
5 INJECTION INTRAVENOUS SEE ADMIN INSTRUCTIONS
Status: DISCONTINUED | OUTPATIENT
Start: 2018-06-13 | End: 2018-06-13

## 2018-06-13 RX ORDER — METOPROLOL TARTRATE 100 MG/1
100 TABLET ORAL ONCE AS NEEDED
Status: ACTIVE | OUTPATIENT
Start: 2018-06-13 | End: 2018-06-13

## 2018-06-13 RX ORDER — METOPROLOL TARTRATE 50 MG/1
50 TABLET, FILM COATED ORAL ONCE AS NEEDED
Status: ACTIVE | OUTPATIENT
Start: 2018-06-13 | End: 2018-06-13

## 2018-06-13 RX ORDER — ACETAMINOPHEN 325 MG/1
650 TABLET ORAL EVERY 6 HOURS PRN
Status: DISCONTINUED | OUTPATIENT
Start: 2018-06-13 | End: 2018-06-15

## 2018-06-13 RX ORDER — METOPROLOL TARTRATE 100 MG/1
100 TABLET ORAL ONCE AS NEEDED
Status: DISCONTINUED | OUTPATIENT
Start: 2018-06-13 | End: 2018-06-13

## 2018-06-13 RX ORDER — SODIUM CHLORIDE 0.9 % (FLUSH) 0.9 %
3 SYRINGE (ML) INJECTION AS NEEDED
Status: DISCONTINUED | OUTPATIENT
Start: 2018-06-13 | End: 2018-06-15

## 2018-06-13 RX ORDER — NITROGLYCERIN 0.4 MG/1
0.4 TABLET SUBLINGUAL EVERY 5 MIN PRN
Status: DISCONTINUED | OUTPATIENT
Start: 2018-06-13 | End: 2018-06-15

## 2018-06-13 RX ORDER — DILTIAZEM HYDROCHLORIDE 5 MG/ML
10 INJECTION INTRAVENOUS SEE ADMIN INSTRUCTIONS
Status: DISCONTINUED | OUTPATIENT
Start: 2018-06-13 | End: 2018-06-13

## 2018-06-13 RX ORDER — METOPROLOL TARTRATE 100 MG/1
100 TABLET ORAL ONCE
Status: COMPLETED | OUTPATIENT
Start: 2018-06-14 | End: 2018-06-14

## 2018-06-13 RX ORDER — ALPRAZOLAM 0.25 MG/1
0.25 TABLET ORAL
Status: DISCONTINUED | OUTPATIENT
Start: 2018-06-13 | End: 2018-06-13

## 2018-06-13 RX ORDER — METOPROLOL TARTRATE 50 MG/1
50 TABLET, FILM COATED ORAL ONCE AS NEEDED
Status: DISCONTINUED | OUTPATIENT
Start: 2018-06-13 | End: 2018-06-13

## 2018-06-13 RX ORDER — ALPRAZOLAM 0.25 MG/1
0.25 TABLET ORAL ONCE AS NEEDED
Status: ACTIVE | OUTPATIENT
Start: 2018-06-14 | End: 2018-06-14

## 2018-06-13 RX ORDER — METOPROLOL TARTRATE 50 MG/1
50 TABLET, FILM COATED ORAL ONCE AS NEEDED
Status: DISCONTINUED | OUTPATIENT
Start: 2018-06-13 | End: 2018-06-15

## 2018-06-13 RX ORDER — NITROGLYCERIN 0.4 MG/1
0.4 TABLET SUBLINGUAL ONCE
Status: DISCONTINUED | OUTPATIENT
Start: 2018-06-13 | End: 2018-06-13

## 2018-06-13 RX ORDER — ASPIRIN 325 MG
325 TABLET ORAL DAILY
Status: DISCONTINUED | OUTPATIENT
Start: 2018-06-13 | End: 2018-06-15

## 2018-06-13 RX ORDER — METOPROLOL TARTRATE 50 MG/1
50 TABLET, FILM COATED ORAL ONCE
Status: COMPLETED | OUTPATIENT
Start: 2018-06-13 | End: 2018-06-13

## 2018-06-13 RX ORDER — METOPROLOL TARTRATE 50 MG/1
50 TABLET, FILM COATED ORAL ONCE AS NEEDED
Status: ACTIVE | OUTPATIENT
Start: 2018-06-14 | End: 2018-06-14

## 2018-06-13 NOTE — ED INITIAL ASSESSMENT (HPI)
Via medics from home---fall x 2 today, no injury. Was lowered to the ground onto his knees. Medics report multiple falls this week    Discharged 6/3 from this hospital for AMS and dementia.  Recent medication changes as well  Patient more confused at this t

## 2018-06-13 NOTE — H&P
[facility]    PATIENT'S NAME: Anettemarbella Jacqueline PHYSICIAN: Dahiana Weathers MD   PATIENT ACCOUNT#:   [id number]    LOCATION:  [address] [place] [address] RECORD #:   [id number]       YOB: 1955  ADMISSION DATE:       06/13/20 intact. NECK:  Supple. No lymphadenopathy. Trachea midline. Full range of motion. LUNGS:  Clear to auscultation bilaterally. Normal respiratory effort. No intercostal retractions. HEART:  Regular rate and rhythm. S1 and S2 auscultated.   No mur

## 2018-06-13 NOTE — ED PROVIDER NOTES
Patient Seen in: [facility] AND [facility] Emergency Department    History   Patient presents with:  Fall (musculoskeletal, neurologic)    Stated Complaint: falls    HPI    The patient is a 61-year-old male with a history of severe Lewy body dementia who was wal Constitutional: He is oriented to person, place, and time. He appears well-developed and well-nourished. HENT:   Head: Normocephalic and atraumatic.    Mouth/Throat: Oropharynx is clear and moist.   Eyes: Conjunctivae and EOM are normal. Pupils are [place] tests on the individual orders.    URINALYSIS WITH CULTURE REFLEX   RAINBOW DRAW BLUE   RAINBOW DRAW LAVENDER   RAINBOW DRAW DARK GREEN   RAINBOW DRAW LIGHT GREEN   RAINBOW DRAW GOLD   RAINBOW DRAW LAVENDER TALL (BNP)     EKG    Rate, intervals and axes as

## 2018-06-13 NOTE — ED NOTES
Pt stated he had to use the restroom. When walked to the bathroom, I left the door open a crack to keep an eye on him, pt was handed cup. Handed me back the empty cup and stated he was told to \"pee in the sink\". No urine in sink either.  Walked back to be

## 2018-06-14 ENCOUNTER — APPOINTMENT (OUTPATIENT)
Dept: CT IMAGING | Facility: HOSPITAL | Age: 63
End: 2018-06-14
Attending: HOSPITALIST
Payer: COMMERCIAL

## 2018-06-14 PROCEDURE — 75574 CT ANGIO HRT W/3D IMAGE: CPT | Performed by: HOSPITALIST

## 2018-06-14 PROCEDURE — 99226 SUBSEQUENT OBSERVATION CARE: CPT | Performed by: HOSPITALIST

## 2018-06-14 RX ORDER — HALOPERIDOL 5 MG
5 TABLET ORAL
Status: DISCONTINUED | OUTPATIENT
Start: 2018-06-15 | End: 2018-06-15

## 2018-06-14 RX ORDER — LEVOTHYROXINE SODIUM 0.03 MG/1
25 TABLET ORAL
Status: DISCONTINUED | OUTPATIENT
Start: 2018-06-14 | End: 2018-06-15

## 2018-06-14 RX ORDER — DIVALPROEX SODIUM 500 MG/1
500 TABLET, DELAYED RELEASE ORAL 2 TIMES DAILY
Status: DISCONTINUED | OUTPATIENT
Start: 2018-06-14 | End: 2018-06-15

## 2018-06-14 RX ORDER — FLUOXETINE HYDROCHLORIDE 20 MG/1
20 CAPSULE ORAL DAILY
Status: DISCONTINUED | OUTPATIENT
Start: 2018-06-15 | End: 2018-06-15

## 2018-06-14 RX ORDER — CLONAZEPAM 1 MG/1
1 TABLET ORAL 3 TIMES DAILY PRN
Status: DISCONTINUED | OUTPATIENT
Start: 2018-06-14 | End: 2018-06-15

## 2018-06-14 RX ORDER — 0.9 % SODIUM CHLORIDE 0.9 %
VIAL (ML) INJECTION
Status: COMPLETED
Start: 2018-06-14 | End: 2018-06-14

## 2018-06-14 RX ORDER — NITROGLYCERIN 0.4 MG/1
TABLET SUBLINGUAL
Status: COMPLETED
Start: 2018-06-14 | End: 2018-06-14

## 2018-06-14 RX ORDER — ZIPRASIDONE HYDROCHLORIDE 40 MG/1
80 CAPSULE ORAL 2 TIMES DAILY WITH MEALS
Status: DISCONTINUED | OUTPATIENT
Start: 2018-06-14 | End: 2018-06-15

## 2018-06-14 RX ORDER — POTASSIUM CHLORIDE 20 MEQ/1
40 TABLET, EXTENDED RELEASE ORAL ONCE
Status: COMPLETED | OUTPATIENT
Start: 2018-06-14 | End: 2018-06-14

## 2018-06-14 RX ORDER — HALOPERIDOL 5 MG/ML
5 INJECTION INTRAMUSCULAR ONCE
Status: COMPLETED | OUTPATIENT
Start: 2018-06-14 | End: 2018-06-14

## 2018-06-14 RX ORDER — ATORVASTATIN CALCIUM 20 MG/1
20 TABLET, FILM COATED ORAL DAILY
Status: DISCONTINUED | OUTPATIENT
Start: 2018-06-14 | End: 2018-06-14

## 2018-06-14 RX ORDER — DONEPEZIL HYDROCHLORIDE 10 MG/1
10 TABLET, FILM COATED ORAL NIGHTLY
Status: DISCONTINUED | OUTPATIENT
Start: 2018-06-14 | End: 2018-06-15

## 2018-06-14 RX ORDER — FLUOXETINE HYDROCHLORIDE 20 MG/1
40 CAPSULE ORAL DAILY
Status: DISCONTINUED | OUTPATIENT
Start: 2018-06-14 | End: 2018-06-14

## 2018-06-14 RX ORDER — MEMANTINE HYDROCHLORIDE 10 MG/1
10 TABLET ORAL 2 TIMES DAILY
Status: DISCONTINUED | OUTPATIENT
Start: 2018-06-14 | End: 2018-06-15

## 2018-06-14 RX ORDER — ISOSORBIDE MONONITRATE 30 MG/1
30 TABLET, EXTENDED RELEASE ORAL DAILY
Status: DISCONTINUED | OUTPATIENT
Start: 2018-06-14 | End: 2018-06-15

## 2018-06-14 RX ORDER — ATORVASTATIN CALCIUM 40 MG/1
80 TABLET, FILM COATED ORAL DAILY
Status: DISCONTINUED | OUTPATIENT
Start: 2018-06-15 | End: 2018-06-15

## 2018-06-14 NOTE — BH PROGRESS NOTE
Behavioral Health Note  CHIEF COMPLAINT  Chest Pain  REASON FOR ADMISSION  Chest Pain    SOCIAL HISTORY  The patient lives with his wife. The patient denies smoking, drinking or drugs.    PAST PSYCHIATRIC HISTORY  The patient has a h/o lewy body dementia an

## 2018-06-14 NOTE — IMAGING NOTE
RECEIVED REPORT  FROM PRIMARY CARE RN FOR PTPAUL:   NO METOPROLOL HAS MELINDA GIVEN/NEEDED AS PTS HR IS STEADY IN THE 50'S. BP STABLE  REVIEWED PT HISTORY.   REVIEWED CONSENT     CREAT 0.95   BMI 30.02  GFR>60      TO CT TABLE O2 PLACED AT 2 L NASAL CANNULA

## 2018-06-14 NOTE — PROGRESS NOTES
[facility]    Progress Note    Meera Chris.  [facility] Patient Status:  Observation    1955 MRN [id number]   [facility] 3W/SW Attending Mai Arroyo MD   Hosp Day # 0 PCP Nadine Villalobos MD, MD       Subjective:   Catarino Salas criteria for LVH met -Voltage criteria w/o ST/T abnormality may be normal.  Poor R Wave Progression -consider old anterior infarct.  ABNORMAL When compared with ECG of 05/28/2018 16:53:34 PAC's are now present Electronically signed on 06/13/2018 at 17:19 by

## 2018-06-14 NOTE — IMAGING NOTE
RECEIVED REPORT FROM JOI MILLER, PRIMARY FOR PT.    HR *** BP***   METOPROLOL PO GIVEN ***50 MILLIGRAMS  MILLIGRAMS  MG AT***    HR*** BP*** METOPROLOL 5 MILLIGRAM GIVEN IV PUSH  SEE  PROTOCOL    HR***BP***METOPROLOL 5 MILLIGRAMS GIVEN IV PUSH     HR

## 2018-06-14 NOTE — PROGRESS NOTES
06/13/18 2103   Clinical Encounter Type   Visited With Patient   Routine Visit Introduction  ([facility])   Continue Visiting Yes   Buddhist Encounters   Spiritual Requests During Visit / Hospitalization Holiness Communion   Sacramental Enc

## 2018-06-14 NOTE — IMAGING NOTE
Coronary CT angiogram performed.   Findings:  prox lad non calcified 50%, mid lad calcified < 50%  Distal LAD 50-70% but very small vessel in this area  d1 mid 50-70%  d2 > 70% but a tiny vessel  Mid lcx 50-70%  prox to mid rca < 50% calcified  Mild disease

## 2018-06-15 VITALS
DIASTOLIC BLOOD PRESSURE: 73 MMHG | HEART RATE: 50 BPM | OXYGEN SATURATION: 95 % | WEIGHT: 182 LBS | BODY MASS INDEX: 29 KG/M2 | RESPIRATION RATE: 18 BRPM | SYSTOLIC BLOOD PRESSURE: 112 MMHG | TEMPERATURE: 98 F

## 2018-06-15 PROCEDURE — 99217 OBSERVATION CARE DISCHARGE: CPT | Performed by: HOSPITALIST

## 2018-06-15 RX ORDER — HALOPERIDOL 5 MG
5 TABLET ORAL
Qty: 30 TABLET | Refills: 0 | Status: ON HOLD | OUTPATIENT
Start: 2018-06-16 | End: 2018-07-31

## 2018-06-15 RX ORDER — ZIPRASIDONE HYDROCHLORIDE 80 MG/1
80 CAPSULE ORAL 2 TIMES DAILY WITH MEALS
Qty: 60 CAPSULE | Refills: 0 | Status: ON HOLD | OUTPATIENT
Start: 2018-06-15 | End: 2018-07-31

## 2018-06-15 RX ORDER — ATORVASTATIN CALCIUM 80 MG/1
80 TABLET, FILM COATED ORAL DAILY
Qty: 30 TABLET | Refills: 0 | Status: SHIPPED | OUTPATIENT
Start: 2018-06-15

## 2018-06-15 RX ORDER — ISOSORBIDE MONONITRATE 30 MG/1
30 TABLET, EXTENDED RELEASE ORAL DAILY
Qty: 30 TABLET | Refills: 0 | Status: SHIPPED | OUTPATIENT
Start: 2018-06-15

## 2018-06-15 RX ORDER — ASPIRIN 81 MG/1
81 TABLET, CHEWABLE ORAL DAILY
Status: DISCONTINUED | OUTPATIENT
Start: 2018-06-15 | End: 2018-06-15

## 2018-06-15 RX ORDER — FLUOXETINE HYDROCHLORIDE 20 MG/1
20 CAPSULE ORAL DAILY
Qty: 30 CAPSULE | Refills: 0 | Status: ON HOLD | OUTPATIENT
Start: 2018-06-16 | End: 2018-07-31

## 2018-06-15 RX ORDER — ASPIRIN 81 MG/1
81 TABLET, CHEWABLE ORAL DAILY
Qty: 30 TABLET | Refills: 0 | Status: SHIPPED | OUTPATIENT
Start: 2018-06-15

## 2018-06-15 NOTE — CONSULTS
[facility]    PATIENT'S NAME: Pradeep Mera PHYSICIAN: Joselo Barkley MD   CONSULTING PHYSICIAN: Aundrea Livingston MD   PATIENT ACCOUNT#:   [id number]    LOCATION:  [address] [place] [address] #:   [id number]       DATE OF BI conversation with him because he does not make sense, she says. He has become less able to function. He will leave the water running.   When he needs to go to urinate, he will start walking to the bathroom but start taking his pants off before he gets the b.i.d., Lopressor, Nitrostat p.r.n., potassium. He has p.r.n. Xanax 0.25 mg before his CT coronary angiogram, Klonopin 1 mg t.i.d. p.r.n., Geodon 20 mg IM q.6 h. p.r.n. Haldol IM was given as a one-time dose. ALLERGIES:  No known drug allergies.     PA male is admitted to the hospital with falling at home and some chest pain. His wife notes that he has gotten a lot worse over the last month.   The Depakote seems to have been helpful in controlling his anger; however, current medications have not been hel

## 2018-06-15 NOTE — CONSULTS
[facility]    PATIENT'S NAME: Samantachuy Prince   ATTENDING PHYSICIAN: Niles Sacks, MD   CONSULTING PHYSICIAN: Daniel Diane DO   PATIENT ACCOUNT#:   [id number]    LOCATION:  [address] [place] [address] #:   [id number]       DATE OF BIRTH:  0 negative, repeat ordered. EKG performed x2, reviewed today demonstrating nonspecific T-wave change inferiorly, unchanged from previous ECG yesterday. IMPRESSION:    1. Coronary artery disease.   Proximal LAD with noncalcified 50% stenosis, mid LAD you very much for the consultation. Please do not hesitate to call with any questions.     Dictated By Valerie Jacobson DO  d: 06/14/2018 18:08:01  t: 06/14/2018 21:12:25  [facility] [id number]  AS/

## 2018-06-15 NOTE — PROGRESS NOTES
Patient seen in follow up. Patient denies any chest pain or sob. Very limited history. Disoriented, was walking around in the room had to have a sitter.    06/15/18  0548   BP: 107/74   Pulse: 50   Resp: 18   Temp: 98.3 °F (36.8 °C)       Intake/Out ondansetron HCl (ZOFRAN) injection 4 mg 4 mg Intravenous Q6H PRN   Ziprasidone Mesylate (GEODON) 20 MG injection 20 mg 20 mg Intramuscular Q6H PRN       Prescriptions Prior to Admission:  ClonazePAM 1 MG Oral Tab Take 1 tablet (1 mg total) by mouth 3 (thre CONCLUSION:  1. Small hiatal hernia. 2. Lung findings consistent with air trapping and small airways disease. Mild emphysematous changes. 3. 8 x 7 mm noncalcified nodular versus nodular atelectasis at the posterior left lung base.  4. The findings include m TROP 0.00 06/14/2018       IMPRESSION:    1. Coronary artery disease. Proximal LAD with noncalcified 50% stenosis, mid LAD with calcified less than 50% stenosis, distal LAD with 50% to 70% stenosis, but at this area, it is very small.   First diagona

## 2018-06-15 NOTE — DISCHARGE SUMMARY
[facility] - [facility]    Discharge Summary    [address]IE [address]  [facility] Patient Status:  Observation    1955 MRN [id number]   Location [facility] 3W/SW Attending Mai Arroyo MD   Hosp Day # 0 PCP Nadine Villalobos MD, MD     Date of Admission mg BID PO  Prozac 20 mg daily for 2-3 weeks and if not helping then discontinue        Hypokalemia  On protocol      Quality:  · DVT Prophylaxis: heparin   CODE status: Full code     Complications: none      Consultants     Provider Role Specialty    [facility] haloperidol 5 MG Tabs  Commonly known as:  HALDOL  Start taking on:  6/16/2018      Take 1 tablet (5 mg total) by mouth every morning before breakfast.   Quantity:  30 tablet  Refills:  0     Isosorbide Mononitrate ER 30 MG Tb24  Commonly known as:  IMDU Take 1 tablet by mouth daily.    Refills:  0        STOP taking these medications    QUEtiapine Fumarate 50 MG Tabs  Commonly known as:  SEROQUEL              Where to Get Your Medications      Please  your prescriptions at the location directed by mariella

## 2018-06-15 NOTE — PLAN OF CARE
CARDIOVASCULAR - ADULT    • Maintains optimal cardiac output and hemodynamic stability Progressing    • Absence of cardiac arrhythmias or at baseline Progressing      Pt c/o midsternal CP, unable to describe it further.  Dr Duke Agudelo aware and Dr Dolores Larson at b

## 2018-06-22 NOTE — ED NOTES
Pre-Cert Information     Called [phone]  Spoke with [place] Kate      Authorized 5 days; 6/22 to 6/26  Review is on 6/26     Kym indicated that pre-cert line needs to be called once pt is formally admitted to complete the pre-cert request.      Authori

## 2018-06-22 NOTE — ED PROVIDER NOTES
Patient Seen in: [facility] AND [facility] Emergency Department    History   Patient presents with:  Altered Mental Status (neurologic)    Stated Complaint:     HPI    Mr. Gabino Billings is a 51-year-old male who presents to the emergency department with his family.   P Eyes: Conjunctivae and EOM are normal. Pupils are equal, round, and reactive to light. Neck: Neck supple. Cardiovascular: Normal rate, regular rhythm and normal heart sounds.     Pulmonary/Chest: Effort normal.   Musculoskeletal: Normal range of motio

## 2018-06-22 NOTE — BH LEVEL OF CARE ASSESSMENT
Level of Care Assessment Note    General Questions  Why are you here?: Pt unable to answer question due to condition. Precipitating Events: Pt attempted to get out of a moving vehicle and once he exited the vehicle he took off running.    History of Prese acted in such a way that would lead her to believe he was going to take his own life. But per wife she is concerned about the pt's erratic behavior. Is your experience of thoughts of dying by suicide:  Other  Protective Factors: Family, grandson   Past [place] it's difficult for him to calm down enough to sleep. Anxiety Symptoms: No problems reported or observed. Bipolar Symptoms: No problems reported or observed  Sleep Pattern: Difficulty falling asleep (Pt has not been sleeping well for the past few days.  P (N/A)  Concerns/Conflicts with Social Relationships: Yes  Describe Concerns/Conflicts with Social Relationships[de-identified] Pt's wife is no longer working to help take care of and manage pt. Decreased Functional Ability: Other (comment); Driving;Cook; Complete ADLs; Summary: Pt is a 66year old male with a history of Lewy Body Dementia. Pt was brought to the ER today for attempting to exit a moving vehicle. Per wife this is the first time the pt has attmepted this.  But per the wife the pt has been climbing the 6 foot

## 2018-06-22 NOTE — ED NOTES
[facility] [phone] unable to accept pt.      [facility] - gave clinical, faxed packet, awaiting response

## 2018-06-22 NOTE — ED NOTES
[facility] for an update. Still reviewing.      [facility] - gave clinical, faxed packet, awaiting response

## 2018-06-22 NOTE — ED INITIAL ASSESSMENT (HPI)
PER PD PT GOT OUT OF A MOVING VEHICLE ON [address]. WIFE WAS DRIVING AND SAID THAT SHE WAS ABLE TO PULL OVER AND SLOW THE CAR DOWN. PT DID NOT HIT GROUND. PER WIFE SAID HE TOOK OF RUNNING. PT HAS HX. DEMENTIA.

## 2018-06-22 NOTE — ED NOTES
Aubrie Sorenson - notified [facility] of pt. Will review.  Will need results of UDS before they can staff with MD.

## 2021-06-21 ENCOUNTER — APPOINTMENT (OUTPATIENT)
Dept: SURGERY | Age: 66
End: 2021-06-21

## 2021-06-28 ENCOUNTER — OFFICE VISIT (OUTPATIENT)
Dept: SURGERY | Age: 66
End: 2021-06-28

## 2021-06-28 VITALS — SYSTOLIC BLOOD PRESSURE: 117 MMHG | OXYGEN SATURATION: 96 % | DIASTOLIC BLOOD PRESSURE: 80 MMHG | HEART RATE: 77 BPM

## 2021-06-28 DIAGNOSIS — K40.90 NON-RECURRENT UNILATERAL INGUINAL HERNIA WITHOUT OBSTRUCTION OR GANGRENE: Primary | ICD-10-CM

## 2021-06-28 PROCEDURE — 99213 OFFICE O/P EST LOW 20 MIN: CPT | Performed by: SURGERY

## 2021-10-06 ENCOUNTER — PREP FOR CASE (OUTPATIENT)
Dept: SURGERY | Age: 66
End: 2021-10-06

## 2021-10-06 DIAGNOSIS — K40.90 INGUINAL HERNIA, RIGHT: Primary | ICD-10-CM

## 2021-10-20 ENCOUNTER — TELEPHONE (OUTPATIENT)
Dept: SURGERY | Age: 66
End: 2021-10-20

## 2021-10-25 PROCEDURE — 49525 REPAIR ING HERNIA SLIDING: CPT | Performed by: SURGERY

## 2021-10-26 ENCOUNTER — TELEPHONE (OUTPATIENT)
Dept: SURGERY | Age: 66
End: 2021-10-26

## 2021-11-11 ENCOUNTER — APPOINTMENT (OUTPATIENT)
Dept: SURGERY | Age: 66
End: 2021-11-11

## 2021-11-18 ENCOUNTER — OFFICE VISIT (OUTPATIENT)
Dept: SURGERY | Age: 66
End: 2021-11-18

## 2021-11-18 DIAGNOSIS — K40.90 INGUINAL HERNIA, RIGHT: Primary | ICD-10-CM

## 2021-11-18 PROCEDURE — 99024 POSTOP FOLLOW-UP VISIT: CPT | Performed by: SURGERY

## (undated) NOTE — LETTER
[facility]    Consent for Coronary CT Angiography    Your doctor has recommended that you have a Coronary CT Angiography procedure.  Coronary CT Angiography is a diagnostic procedure using computed tomography to scan the patient when taking medication. Allergic reactions to medication can range from very minor to very serious leading to a life threatening situation or even death. Please be sure to communicate any allergy you may have to your caregiver immediately.     The i

## (undated) NOTE — LETTER
Hospital Discharge Documentation  Please phone to schedule a hospital follow up appointment.     From: [address] Hospitalist's Office  Phone: [phone]    Patient discharged time/date: 6/2/2018  1:39 PM  Patient discharge disposition:  Home or Self wandering behaviors and his violent outbursts have much diminished.   He only wanders to his sister's home which is across a street from where he lives, and he had several good nights of no violent outbursts here in the hospital after Depakote was increased NEUROLOGIC:  Alert, oriented to himself, and remarkably friendly and cooperative when I saw him. LABORATORY STUDIES:  Please see chart. ASSESSMENT AND PLAN:    1. Frontotemporal versus Lewy body dementia.   The patient is actually doing better on th Attribution Key     LS. 1 - Lg Alicea MD on 6/4/2018  1:59 PM

## (undated) NOTE — LETTER
Hospital Discharge Documentation  Please phone to schedule a hospital follow up appointment.     From: [address] Hospitalist's Office  Phone: [phone]    Patient discharged time/date: 6/15/2018  1:59 PM  Patient discharge disposition:  Home or Self gallops. Abdomen: Soft, nontender, nondistended. Positive bowel sounds. No rebound or guarding. Neurologic: No focal neurological deficits. Musculoskeletal: Moves all extremities.     Hospital Course:   Acute chest pain  - CT coronaries results review Take 1 tablet (25 mcg total) by mouth before breakfast.    Melatonin 10 MG Oral Tab  Take 1 tablet by mouth. Memantine HCl 10 MG Oral Tab  Take 10 mg by mouth 2 (two) times daily.     Donepezil HCl 10 MG Oral Tab  Take 1 tablet (10 mg total) by mouth nig Take 1 tablet (500 mg total) by mouth 2 (two) times daily. Quantity:  60 tablet  Refills:  0     Donepezil HCl 10 MG Tabs  Commonly known as:  ARICEPT      Take 1 tablet (10 mg total) by mouth nightly.    Quantity:  30 tablet  Refills:  2     Levothyr

## (undated) NOTE — LETTER
Hospital Discharge Documentation  Please phone to schedule a hospital follow up appointment. No discharge summary available at this time, below is the most recent progress note  for your review .         From: [address] Hospitalist's Office  Phone: [phone] Anxiety    -cont Aricept and Namenda for now  -no h/o ETOH  -check TSH, B12, ESR, LFT  -psychiatry consultation, d/w Dr. Jerome Swanson, will start depakote for mood stabilization     Benign hypertension  Blood pressure well controlled  -hold metoprolol b/c pao